# Patient Record
(demographics unavailable — no encounter records)

---

## 2025-07-29 NOTE — ASSESSMENT
[FreeTextEntry1] : 58 year M WITH MODERATE LT HIP PAIN. PAIN IS TO THE LATERAL ASPECT OF THE HIP AND RADIATES DOWN THE LE. PAIN WORSENS WITH PROLONGED STANDING AND SITTING TO STAND. PAIN IS AFFECTING FUNCTIONAL ACTIVITIES. THERE IS NO REPRODUCIBLE GROIN PAIN ON PHYSICAL EXAM. XRAYS REVIEWED AS NORMAL. TREATMENT OPTIONS REVIEWED. LUMBAR PT RX. WILL ORDER LUMBAR MRI AND FOLLOW UP WITH PAIN MANAGEMENT. QUESTIONS ANSWERED.   MEDICATION USE DISCUSSED. PRESCRIBED DICLOFENAC 75MG TO BE TAKEN BID AS NEEDED FOR PAIN, WITH CAUTION TO USE AND SIDE EFFECTS.

## 2025-07-29 NOTE — HISTORY OF PRESENT ILLNESS
[3] : 3 [Intermittent] : intermittent [de-identified] : 07/29/25: Mat is a 58 year old male presenting today for left hip pain. Patient states he has had on and off pain for 6 months, no injury.  [FreeTextEntry1] : Left hip

## 2025-07-29 NOTE — DISCUSSION/SUMMARY
[de-identified] : I, Radha Nascimento, am scribing for Dr. Ojeda in his presence for the chief complaint, physical exam, studies, assessment, and/or plan.